# Patient Record
Sex: MALE | URBAN - METROPOLITAN AREA
[De-identification: names, ages, dates, MRNs, and addresses within clinical notes are randomized per-mention and may not be internally consistent; named-entity substitution may affect disease eponyms.]

---

## 2023-08-07 ENCOUNTER — NURSING HOME VISIT (OUTPATIENT)
Dept: GERIATRICS | Facility: OTHER | Age: 88
End: 2023-08-07

## 2023-08-07 DIAGNOSIS — E11.65 TYPE 2 DIABETES MELLITUS WITH HYPERGLYCEMIA, WITHOUT LONG-TERM CURRENT USE OF INSULIN (HCC): ICD-10-CM

## 2023-08-07 DIAGNOSIS — N40.0 BENIGN PROSTATIC HYPERPLASIA, UNSPECIFIED WHETHER LOWER URINARY TRACT SYMPTOMS PRESENT: ICD-10-CM

## 2023-08-07 DIAGNOSIS — G81.94 LEFT HEMIPLEGIA (HCC): ICD-10-CM

## 2023-08-07 DIAGNOSIS — E78.2 MIXED HYPERLIPIDEMIA: ICD-10-CM

## 2023-08-07 DIAGNOSIS — R33.9 URINARY RETENTION: ICD-10-CM

## 2023-08-07 DIAGNOSIS — I69.391 DYSPHAGIA DUE TO RECENT STROKE: ICD-10-CM

## 2023-08-07 DIAGNOSIS — E66.9 OBESITY (BMI 30-39.9): ICD-10-CM

## 2023-08-07 DIAGNOSIS — I10 PRIMARY HYPERTENSION: ICD-10-CM

## 2023-08-07 PROBLEM — I63.511 ACUTE RIGHT MCA STROKE (HCC): Status: ACTIVE | Noted: 2023-08-07

## 2023-08-07 RX ORDER — DOCUSATE SODIUM 100 MG/1
100 CAPSULE, LIQUID FILLED ORAL 2 TIMES DAILY PRN
COMMUNITY
End: 2023-08-15

## 2023-08-07 RX ORDER — METHOCARBAMOL 750 MG/1
750 TABLET, FILM COATED ORAL EVERY 8 HOURS SCHEDULED
COMMUNITY
End: 2023-08-15

## 2023-08-07 RX ORDER — TAMSULOSIN HYDROCHLORIDE 0.4 MG/1
0.4 CAPSULE ORAL
COMMUNITY
End: 2023-08-15

## 2023-08-07 RX ORDER — ATORVASTATIN CALCIUM 40 MG/1
40 TABLET, FILM COATED ORAL DAILY
COMMUNITY
End: 2023-08-15

## 2023-08-07 RX ORDER — LOSARTAN POTASSIUM 25 MG/1
25 TABLET ORAL DAILY
COMMUNITY
End: 2023-08-15

## 2023-08-07 NOTE — ASSESSMENT & PLAN NOTE
S/p embolectomy as per patient   Continue with current meds  Continue with activity as tolerable  PT/OT eval and treat

## 2023-08-07 NOTE — PROGRESS NOTES
[facility] [address] [facility] home notes  SHORT TERM REHAB       NAME: Yovany Rivas  AGE: 80 y.o. SEX: male    DATE OF ENCOUNTER: 8/7/2023    Assessment and Plan   Acute right MCA stroke [facility])  S/p embolectomy as per patient   Continue with current meds  Continue with activity as tolerable  PT/OT eval and treat     Left hemiplegia ([facility])  Secondary to CVA  Continue with supportive care   Continue with safety measures     Dysphagia due to recent stroke  Seems to be improving  Speech eval and treat    Hypertension  Continue current med  Continue monitoring BP      Mixed hyperlipidemia  Continue statins  Also due to CVA continue meds    Type 2 diabetes mellitus with hyperglycemia, without long-term current use of insulin (HCC)  Continue current meds  Last Hba1c reported to 10.6 poor control  Continue CCD diet   Continue monitoring blood sugars       Obesity (BMI 30-39. 9)  Continue with diet management     BPH (benign prostatic hyperplasia)  Continue current meds  Continue monitoring symptoms     Urinary retention  Continue with shepard cath care  Continue monitoring symptoms  Continue current meds       Chief Complaint     No new complaints     History of Present Illness     81 yo male seen for admission to [address] [place] [address] after hospitalization and rehab stay. Patient reports he went to the hospital due to stroke. When asked more details reports he could not get up from bed and his significant other came home and called the EMS. He was taken to the hospital and managed for CVA. Patient report he had embolectomy and then once stable then transferred to [facility] and continued with rehab. He was then taken to another rehab but was not happy there then transferred to [address] [place] [address]. Reviewed rehab and hospital notes and labs.      PMHx     Past Medical History:   Diagnosis Date   • AAA (abdominal aortic aneurysm) ([facility])    • Coronary artery disease    • Diabetes mellitus ([facility])    • Hyperlipidemia    • Hypertension      Past Surgical History:   Procedure Laterality Date   • ABDOMINAL AORTIC ANEURYSM REPAIR     • BYPASS FEMORAL-FEMORAL       Family History   Problem Relation Age of Onset   • Lung cancer Father    • Diabetes Maternal Uncle      Social History     Socioeconomic History   • Marital status: /Civil Union     Spouse name: None   • Number of children: None   • Years of education: None   • Highest education level: None   Occupational History   • None   Tobacco Use   • Smoking status: Former     Types: Cigarettes   • Smokeless tobacco: Never   Substance and Sexual Activity   • Alcohol use:  Yes     Alcohol/week: 5.0 standard drinks of alcohol     Types: 5 Glasses of wine per week   • Drug use: Never   • Sexual activity: None   Other Topics Concern   • None   Social History Narrative   • None     Social Determinants of Health     Financial Resource Strain: Not on file   Food Insecurity: Not on file   Transportation Needs: Not on file   Physical Activity: Not on file   Stress: Not on file   Social Connections: Not on file   Intimate Partner Violence: Not on file   Housing Stability: Not on file     Allergies   Allergen Reactions   • Ciprofloxacin Other (See Comments)     unknown   • Codeine Other (See Comments)     unknown   • Statins Other (See Comments)     weak   • Influenza Virus Vaccine Other (See Comments)       Review of Systems     Left sided weakness  All other review of system negative        Objective   Vital signs:  BP: 127/79  HR: 100  RR: 20  TEMP: 98.9F  SAT.: 97% on RA    PHYSICAL EXAM:  GENERAL: no acute distress  SKIN: warm, dry, no rash, no cyanosis, venous stasis changes of the legs  HEENT: normocephalic, atraumatic, no JVD, no Thyromegaly, no lymphadenopathy  LUNGS: CTA, no wheezing, no rales, expanded equally, no chest tenderness   HEART: normal rhythm, normal rate, no murmur, no gallop  ABDOMEN: soft non tender non distended bs+, no guarding or rebound tenderness  :  no suprapubic tenderness, shepard cath present  MUSCULOSKELETAL: strength about 5/5 all extremities except left side hemiplegia. no calf tenderness  NEUROLOGY: awake, alert, Oriented to person and place, able to recall 2/3 object. CN2-12 intact. PSYCH: cooperative, pleasant. Pertinent Laboratory/Diagnostic Studies:  Recent labs and diagnostic tests reviewed in nursing home EMR    Current Medications   Medications reviewed and signed off on nursing home EMR.

## 2023-08-07 NOTE — ASSESSMENT & PLAN NOTE
Continue current meds  Last Hba1c reported to 10.6 poor control  Continue CCD diet   Continue monitoring blood sugars

## 2023-08-15 PROBLEM — E66.9 OBESITY (BMI 30-39.9): Status: RESOLVED | Noted: 2023-08-07 | Resolved: 2023-08-15

## 2023-08-15 PROBLEM — I63.511 ACUTE RIGHT MCA STROKE (HCC): Status: RESOLVED | Noted: 2023-08-07 | Resolved: 2023-08-15

## 2023-08-15 PROBLEM — E11.65 TYPE 2 DIABETES MELLITUS WITH HYPERGLYCEMIA, WITHOUT LONG-TERM CURRENT USE OF INSULIN (HCC): Status: RESOLVED | Noted: 2023-08-07 | Resolved: 2023-08-15

## 2023-08-15 PROBLEM — E78.2 MIXED HYPERLIPIDEMIA: Status: RESOLVED | Noted: 2023-08-07 | Resolved: 2023-08-15

## 2023-08-15 PROBLEM — G81.94 LEFT HEMIPLEGIA (HCC): Status: RESOLVED | Noted: 2023-08-07 | Resolved: 2023-08-15

## 2023-08-15 PROBLEM — N40.0 BPH (BENIGN PROSTATIC HYPERPLASIA): Status: RESOLVED | Noted: 2023-08-07 | Resolved: 2023-08-15

## 2023-08-15 PROBLEM — I69.391 DYSPHAGIA DUE TO RECENT STROKE: Status: RESOLVED | Noted: 2023-08-07 | Resolved: 2023-08-15

## 2023-08-15 PROBLEM — R33.9 URINARY RETENTION: Status: RESOLVED | Noted: 2023-08-07 | Resolved: 2023-08-15

## 2023-08-15 PROBLEM — I10 HYPERTENSION: Status: RESOLVED | Noted: 2023-08-07 | Resolved: 2023-08-15
